# Patient Record
Sex: MALE | Race: WHITE
[De-identification: names, ages, dates, MRNs, and addresses within clinical notes are randomized per-mention and may not be internally consistent; named-entity substitution may affect disease eponyms.]

---

## 2017-06-22 ENCOUNTER — HOSPITAL ENCOUNTER (EMERGENCY)
Dept: HOSPITAL 10 - FTE | Age: 37
Discharge: HOME | End: 2017-06-22
Payer: MEDICAID

## 2017-06-22 VITALS
SYSTOLIC BLOOD PRESSURE: 153 MMHG | HEART RATE: 72 BPM | TEMPERATURE: 98.1 F | DIASTOLIC BLOOD PRESSURE: 81 MMHG | RESPIRATION RATE: 18 BRPM

## 2017-06-22 VITALS
WEIGHT: 177.47 LBS | HEIGHT: 65 IN | WEIGHT: 177.47 LBS | BODY MASS INDEX: 29.57 KG/M2 | HEIGHT: 65 IN | BODY MASS INDEX: 29.57 KG/M2

## 2017-06-22 DIAGNOSIS — R10.32: ICD-10-CM

## 2017-06-22 DIAGNOSIS — N20.0: Primary | ICD-10-CM

## 2017-06-22 LAB
ADD SCAN DIFF: NO
ALBUMIN SERPL-MCNC: 5.2 G/DL (ref 3.3–4.9)
ALBUMIN/GLOB SERPL: 1.52 {RATIO}
ALP SERPL-CCNC: 84 IU/L (ref 42–121)
ALT SERPL-CCNC: 100 IU/L (ref 13–69)
ANION GAP SERPL CALC-SCNC: 17 MMOL/L (ref 8–16)
AST SERPL-CCNC: 48 IU/L (ref 15–46)
BASOPHILS # BLD AUTO: 0 10^3/UL (ref 0–0.1)
BASOPHILS NFR BLD: 0.2 % (ref 0–2)
BILIRUB DIRECT SERPL-MCNC: 0 MG/DL (ref 0–0.2)
BILIRUB SERPL-MCNC: 0.1 MG/DL (ref 0.2–1.3)
BUN SERPL-MCNC: 10 MG/DL (ref 7–20)
CALCIUM SERPL-MCNC: 9.2 MG/DL (ref 8.4–10.2)
CHLORIDE SERPL-SCNC: 101 MMOL/L (ref 97–110)
CO2 SERPL-SCNC: 24 MMOL/L (ref 21–31)
CREAT SERPL-MCNC: 0.97 MG/DL (ref 0.61–1.24)
EOSINOPHIL # BLD: 0 10^3/UL (ref 0–0.5)
EOSINOPHIL NFR BLD: 0.1 % (ref 0–7)
ERYTHROCYTE [DISTWIDTH] IN BLOOD BY AUTOMATED COUNT: 12.1 % (ref 11.5–14.5)
GLOBULIN SER-MCNC: 3.4 G/DL (ref 1.3–3.2)
GLUCOSE SERPL-MCNC: 137 MG/DL (ref 70–220)
HCT VFR BLD CALC: 43.8 % (ref 42–52)
HGB BLD-MCNC: 14.8 G/DL (ref 14–18)
LYMPHOCYTES # BLD AUTO: 1.6 10^3/UL (ref 0.8–2.9)
LYMPHOCYTES NFR BLD AUTO: 13 % (ref 15–51)
MCH RBC QN AUTO: 29.6 PG (ref 29–33)
MCHC RBC AUTO-ENTMCNC: 33.8 G/DL (ref 32–37)
MCV RBC AUTO: 87.6 FL (ref 82–101)
MONOCYTES # BLD: 0.7 10^3/UL (ref 0.3–0.9)
MONOCYTES NFR BLD: 5.9 % (ref 0–11)
NEUTROPHILS # BLD: 9.9 10^3/UL (ref 1.6–7.5)
NEUTROPHILS NFR BLD AUTO: 79.9 % (ref 39–77)
NRBC # BLD MANUAL: 0 10^3/UL (ref 0–0)
NRBC BLD QL: 0 /100WBC (ref 0–0)
PLATELET # BLD: 296 10^3/UL (ref 140–415)
PMV BLD AUTO: 10.2 FL (ref 7.4–10.4)
POTASSIUM SERPL-SCNC: 3.2 MMOL/L (ref 3.5–5.1)
PROT SERPL-MCNC: 8.6 G/DL (ref 6.1–8.1)
RBC # BLD AUTO: 5 10^6/UL (ref 4.7–6.1)
SODIUM SERPL-SCNC: 139 MMOL/L (ref 135–144)
URINE BLOOD (DIP) POC: (no result)
WBC # BLD AUTO: 12.4 10^3/UL (ref 4.8–10.8)

## 2017-06-22 PROCEDURE — 81003 URINALYSIS AUTO W/O SCOPE: CPT

## 2017-06-22 PROCEDURE — 96375 TX/PRO/DX INJ NEW DRUG ADDON: CPT

## 2017-06-22 PROCEDURE — 96374 THER/PROPH/DIAG INJ IV PUSH: CPT

## 2017-06-22 PROCEDURE — 80053 COMPREHEN METABOLIC PANEL: CPT

## 2017-06-22 PROCEDURE — 36415 COLL VENOUS BLD VENIPUNCTURE: CPT

## 2017-06-22 PROCEDURE — 85025 COMPLETE CBC W/AUTO DIFF WBC: CPT

## 2017-06-22 PROCEDURE — 83690 ASSAY OF LIPASE: CPT

## 2017-06-22 PROCEDURE — 74176 CT ABD & PELVIS W/O CONTRAST: CPT

## 2017-06-22 NOTE — ERD
ER Documentation


Chief Complaint


Date/Time


DATE: 6/22/17 


TIME: 18:33


Chief Complaint


AP RAD BACK SINCE THIS AM





HPI


This is a 37-year-old male who presents to the emergency room for evaluation of 

left-sided flank pain.  The patient states that he has had flank pain for one 

days duration and he has mild radiation to the groin.  The patient denies any 

nausea, vomiting, diarrhea, or fevers associated with this.  He does sharp pain 

with no aggravating or relieving factors for his symptoms.  He came to the ER 

today for evaluation per





ROS


All systems reviewed and are negative except as per history of present illness.





Medications


Home Meds


Reported Medications


[Denies]   No Conflict Check


   5/11/10





Allergies


Allergies:  


Coded Allergies:  


     No Known Drug Allergies (Verified  Allergy, Mild, 5/14/10)





PMhx/Soc


History of Surgery:  No


Anesthesia Reaction:  No


Hx Neurological Disorder:  No


Hx Respiratory Disorders:  No


Hx Cardiac Disorders:  No


Hx Psychiatric Problems:  No


Hx Miscellaneous Medical Probl:  No


Hx Alcohol Use:  Yes


Hx Substance Use:  No


Hx Tobacco Use:  No





Physical Exam


Vitals





Vital Signs








  Date Time  Temp Pulse Resp B/P Pulse Ox O2 Delivery O2 Flow Rate FiO2


 


6/22/17 15:24 98.2 61 20 164/78 99   








Physical Exam


INITIAL VITAL SIGNS: Reviewed by me


GENERAL:  The patient is well developed and appropriate for usual state of 

health in no apparent distress


HEENT: Pupils equal, round, and reactive to light.  EOMI. There is no scleral 

icterus.


NECK:  C-spine is soft and supple, there is no meningismus.  There is no 

cervical lymphadenopathy.


LUNGS:  Clear to auscultation bilaterally. There are no rales, wheezes or 

rhonchi.


HEART:  Regular rate and rhythm, no murmurs, clicks, rubs or gallops.


ABDOMEN: Left lower quadrant tenderness to palpation, otherwise soft, non-tender

, non-distended.  There are bowel sounds in all four quadrants. No rebound or 

guarding.


EXTREMITIES:  There is no peripheral cyanosis or edema.  No focal swelling or 

erythema.


NEUROLOGICAL:  The patient moves all four extremities with 5/5 strength.  

Cranial nerves II - XII are intact. Normal gait. Alert and oriented


SKIN:  There is no apparent rash or petechiae.


HEME/LYMPHATIC:  There is no evidence of excessive bruising or lymphedema.


PSYCHIATRIC:  The patient does not appear anxious or depressed.


Result Diagram:  


6/22/17 1540                                                                   

             6/22/17 1540





Results 24 hrs





 Laboratory Tests








Test


  6/22/17


15:40 6/22/17


16:57


 


White Blood Count 12.410^3/ul  


 


Red Blood Count 5.0010^6/ul  


 


Hemoglobin 14.8g/dl  


 


Hematocrit 43.8%  


 


Mean Corpuscular Volume 87.6fl  


 


Mean Corpuscular Hemoglobin 29.6pg  


 


Mean Corpuscular Hemoglobin


Concent 33.8g/dl 


  


 


 


Red Cell Distribution Width 12.1%  


 


Platelet Count 32098^3/UL  


 


Mean Platelet Volume 10.2fl  


 


Neutrophils % 79.9%  


 


Lymphocytes % 13.0%  


 


Monocytes % 5.9%  


 


Eosinophils % 0.1%  


 


Basophils % 0.2%  


 


Nucleated Red Blood Cells % 0.0/100WBC  


 


Neutrophils # 9.910^3/ul  


 


Lymphocytes # 1.610^3/ul  


 


Monocytes # 0.710^3/ul  


 


Eosinophils # 0.010^3/ul  


 


Basophils # 0.010^3/ul  


 


Nucleated Red Blood Cells # 0.010^3/ul  


 


Sodium Level 139mmol/L  


 


Potassium Level 3.2mmol/L  


 


Chloride Level 101mmol/L  


 


Carbon Dioxide Level 24mmol/L  


 


Anion Gap 17  


 


Blood Urea Nitrogen 10mg/dl  


 


Creatinine 0.97mg/dl  


 


Glucose Level 137mg/dl  


 


Calcium Level 9.2mg/dl  


 


Total Bilirubin 0.1mg/dl  


 


Direct Bilirubin 0.00mg/dl  


 


Indirect Bilirubin 0.1mg/dl  


 


Aspartate Amino Transf


(AST/SGOT) 48IU/L 


  


 


 


Alanine Aminotransferase


(ALT/SGPT) 100IU/L 


  


 


 


Alkaline Phosphatase 84IU/L  


 


Total Protein 8.6g/dl  


 


Albumin 5.2g/dl  


 


Globulin 3.40g/dl  


 


Albumin/Globulin Ratio 1.52  


 


Lipase 46U/L  


 


Bedside Urine pH (LAB)  7.0 


 


Bedside Urine Protein (LAB)  Negative 


 


Bedside Urine Glucose (UA)  Negative 


 


Bedside Urine Ketones (LAB)  Negative 


 


Bedside Urine Blood  2+ 


 


Bedside Urine Nitrite (LAB)  Negative 


 


Bedside Urine Leukocyte


Esterase (L 


  Negative 


 








 Current Medications








 Medications


  (Trade)  Dose


 Ordered  Sig/Desiree


 Route


 PRN Reason  Start Time


 Stop Time Status Last Admin


Dose Admin


 


 Sodium Chloride


  (NS)  1,000 ml @ 


 1,000 mls/hr  Q1H STAT


 IV


   6/22/17 15:32


 6/22/17 16:31 DC 6/22/17 15:46


 


 


 Morphine Sulfate


  (morphine)  2 mg  ONCE  STAT


 IV


   6/22/17 15:32


 6/22/17 15:33 DC 6/22/17 15:46


 


 


 Ondansetron HCl


  (Zofran Inj)  4 mg  ONCE  STAT


 IV


   6/22/17 15:32


 6/22/17 15:33 DC 6/22/17 15:46


 


 


 Famotidine


  (Pepcid Iv)  20 mg  ONCE  STAT


 IV


   6/22/17 15:32


 6/22/17 15:33 DC 6/22/17 15:46


 


 


 Potassium Chloride


  (Klor-Con 20)  40 meq  ONCE  STAT


 PO


   6/22/17 16:21


 6/22/17 16:23 DC 6/22/17 16:45


 


 


 Acetaminophen/


 Hydrocodone Bitart


  (Norco (5/325))  1 tab  ONCE  ONCE


 PO


   6/22/17 17:00


 6/22/17 17:02 DC 6/22/17 17:05


 











Procedures/MDM


CT abdomen pelvis without: 1.  2.3 mm calculus at the left ureterovesicular 

junction which is either on the verge of passing into the bladder or has just 

passed into the bladder.


2.  Associated mild left hydroureteronephrosis and left perinephric stranding.


3.  Normal right kidney and collecting system


4.  Normal appendix\





This is a 37-year-old male presents to the ER for evaluation of abdominal pain.

  On my examination this patient was nontoxic-appearing however he did have 

tenderness to palpation in the left lower quadrant.  I did obtain a urinalysis 

was does show blood in the urine.  CT of the abdomen and pelvis was also 

obtained which does show a 2.3 mm calculus at the left UVJ which is on the 

verge of passing into the bladder.  This patient has no fever, no leukocytosis 

that is significant and the patient's pain is controlled with morphine.  He 

will be discharged home at this time with a prescription for Norco to help him 

passed stone





Departure


Diagnosis:  


 Primary Impression:  


 Kidney stone on left side


 Additional Impressions:  


 Abdominal pain


 Left flank pain


Condition:  YOUSIF Farmer DO Jun 22, 2017 18:35

## 2017-06-22 NOTE — RADRPT
PROCEDURE:   CT abdomen and pelvis without contrast. 

 

CLINICAL INDICATION:   Lower abdominal pain 

 

TECHNIQUE:   Continues 2.5 mm axial images were obtained from the domes of the diaphragms to the inf
erior pubic rami.  No oral or intravenous contrast was administered. The calculated dose length prod
uct (DLP) = 591.03  mGy-cm.  Exam CTDlvol = 9.27 mGy.  One or more of the following dose reduction t
echniques were used: Automated exposure control, adjustment of the mA and or KV according to patient
 size, or use of iterative reconstruction technique.  One or more of the following dose reduction te
chniques were used: Automated exposure control, adjustment of the mA and or KV according to patient 
size, or use of iterative reconstruction technique.  

 

 

 

COMPARISON:   None. 

 

FINDINGS:

Lung bases are clear.  No pleural pericardial fluid is seen.

 

Liver, gallbladder, pancreas, spleen, and adrenals are within normal limits on this noncontrast stud
y.  Evaluation of the genitourinary system demonstrates a 2.3 mm calculus at the left ureterovesicul
ar junction level which is on the verge of passing into the bladder or has just passed into the blad
kezia.  There is mild associated left hydro ureteral nephrosis left perinephric stranding.  The right 
kidney and collecting system are normal.  Aorta is normal in caliber.  No pathologically enlarged me
senteric lymph nodes are seen.  The stomach and small bowel loops are within normal limits.  No smal
l bowel dilatation or obstruction is seen.  No free fluid, free air, abscess is noted in the upper a
bdomen

 

CT pelvis:  Images through the pelvis demonstrate no free fluid, free air, abscess.  Bladder is norm
ally distended.  Prostate and seminal vesicles are unremarkable.  Evaluation of the colon demonstrat
es no diverticulosis, diverticulitis or acute colitis. Normal appendix is visualized. No pathologica
lly enlarged iliac chain lymph nodes are seen.

 

No destructive bony lesions are identified.

 

IMPRESSION:

 

 

1.  2.3 mm calculus at the left ureterovesicular junction which is either on the verge of passing in
to the bladder or has just passed into the bladder.

2.  Associated mild left hydroureteronephrosis and left perinephric stranding.

3.  Normal right kidney and collecting system

4.  Normal appendix

 

 

 

RPTAT: HH

_____________________________________________ 

.Jesus Johnson MD, MD           Date    Time 

Electronically viewed and signed by .Jesus Johnson MD, MD on 06/22/2017 18:31 

 

D:  06/22/2017 18:31  T:  06/22/2017 18:31

.W/

## 2017-11-07 ENCOUNTER — HOSPITAL ENCOUNTER (EMERGENCY)
Dept: HOSPITAL 10 - FTE | Age: 37
Discharge: HOME | End: 2017-11-07
Payer: MEDICAID

## 2017-11-07 VITALS
WEIGHT: 176.59 LBS | HEIGHT: 67 IN | WEIGHT: 176.59 LBS | BODY MASS INDEX: 27.72 KG/M2 | BODY MASS INDEX: 27.72 KG/M2 | HEIGHT: 67 IN

## 2017-11-07 DIAGNOSIS — N20.0: Primary | ICD-10-CM

## 2017-11-07 PROCEDURE — 96374 THER/PROPH/DIAG INJ IV PUSH: CPT

## 2017-11-07 PROCEDURE — 74176 CT ABD & PELVIS W/O CONTRAST: CPT

## 2017-11-07 PROCEDURE — 85025 COMPLETE CBC W/AUTO DIFF WBC: CPT

## 2017-11-07 PROCEDURE — 96376 TX/PRO/DX INJ SAME DRUG ADON: CPT

## 2017-11-07 PROCEDURE — 83690 ASSAY OF LIPASE: CPT

## 2017-11-07 PROCEDURE — 96375 TX/PRO/DX INJ NEW DRUG ADDON: CPT

## 2017-11-07 PROCEDURE — 36415 COLL VENOUS BLD VENIPUNCTURE: CPT

## 2017-11-07 PROCEDURE — 81001 URINALYSIS AUTO W/SCOPE: CPT

## 2017-11-07 PROCEDURE — 80053 COMPREHEN METABOLIC PANEL: CPT

## 2017-11-07 NOTE — ERD
ER Documentation


Chief Complaint


Chief Complaint


left flank pain x 2 days





HPI


This is a 37-year-old male presents to the ER with left-sided flank pain that 

started last night.  Patient has had an episode like this in the past and was 

told he had kidney stones.  Pain was intermittent however now is constant and 

severe.  He admits to nausea however denies vomiting or diarrhea.  Patient 

states that this morning he did have one episode of brown urine, he does admit 

to dysuria however he denies urinary frequency.  Patient denies any fevers or 

chills.





ROS


12 point review of systems was done, all negative except per HPI.





Medications


Home Meds


Active Scripts


Ondansetron Hcl* (Zofran*) 4 Mg Tab, 4 MG PO Q4H Y for NAUSEA AND OR VOMITING 

for 3 Days, TAB


   Prov:ELMERMATJACIEL C         17


Ibuprofen* (Motrin*) 600 Mg Tab, 600 MG PO Q6, #30 TAB


   Prov:ELMERJACIEL C         17


Hydrocodone/Acetaminophen (Norco 5-325 Tablet) 1 Each Tablet, 1 TAB PO Q6H Y 

for PAIN, #20 TAB


   Prov:ELMER,JACIEL C         17


Tamsulosin Hcl* (Flomax*) 0.4 Mg Cap.er.24h, 0.4 MG PO QHS for 5 Days, CAP


   Prov:MAT PAYNEGISEL COOPER         17


Ciprofloxacin Hcl* (Ciprofloxacin Hcl*) 500 Mg Tablet, 500 MG PO BID for 7 Days

, TAB


   Prov:JACIEL PAYNE ALLISON         17


Tamsulosin Hcl* (Flomax*) 0.4 Mg Cap.er.24h, 0.4 MG PO BID, #10 CAP


   Prov:YOUSIF BRADSHAW DO         17


Hydrocodone/Acetaminophen (Norco 5-325 Tablet) 1 Each Tablet, 1 TAB PO Q6H Y 

for PAIN, #7 TAB


   Prov:YOUSIF BRADSHAW DO         17


Reported Medications


[Denies]   No Conflict Check


   5/11/10





Allergies


Allergies:  


Coded Allergies:  


     No Known Drug Allergies (Verified  Allergy, Mild, 5/14/10)





PMhx/Soc


Medical and Surgical Hx:  pt denies Medical Hx, pt denies Surgical Hx


History of Surgery:  No


Anesthesia Reaction:  No


Hx Neurological Disorder:  No


Hx Respiratory Disorders:  No


Hx Cardiac Disorders:  No


Hx Psychiatric Problems:  No


Hx Miscellaneous Medical Probl:  No


Hx Alcohol Use:  Yes (OOC)


Hx Substance Use:  No


Hx Tobacco Use:  No





Physical Exam


Vitals





Vital Signs








  Date Time  Temp Pulse Resp B/P Pulse Ox O2 Delivery O2 Flow Rate FiO2


 


17 14:10 98.7 53 18 155/91 99   








Physical Exam


GENERAL: The patient is well developed and appropriate for usual state of health

, in no apparent distress.


HEENT: Atraumatic. 


CHEST: Clear to auscultation bilaterally. There are no rales, wheezes or 

rhonchi. 


HEART: Regular rate and rhythm. No murmurs, clicks, rubs or gallops.


ABDOMEN: Soft, nontender and nondistended. Good bowel sounds. No rebound or 

guarding. No gross peritonitis. No gross organomegaly or masses. No Brantley sign 

or McBurney point tenderness.


BACK: No midline or flank tenderness..


NEURO: Alert and oriented. 


SKIN:  The skin is warm and dry.


Result Diagram:  


17 1517                                                                   

             17 1517





Results 24 hrs





 Laboratory Tests








Test


  17


15:17


 


White Blood Count 12.310^3/ul 


 


Red Blood Count 5.0010^6/ul 


 


Hemoglobin 15.1g/dl 


 


Hematocrit 45.0% 


 


Mean Corpuscular Volume 90.0fl 


 


Mean Corpuscular Hemoglobin 30.2pg 


 


Mean Corpuscular Hemoglobin


Concent 33.6g/dl 


 


 


Red Cell Distribution Width 11.8% 


 


Platelet Count 87790^3/UL 


 


Mean Platelet Volume 10.6fl 


 


Neutrophils % 80.9% 


 


Lymphocytes % 9.6% 


 


Monocytes % 8.6% 


 


Eosinophils % 0.2% 


 


Basophils % 0.1% 


 


Nucleated Red Blood Cells % 0.0/100WBC 


 


Neutrophils # 10.010^3/ul 


 


Lymphocytes # 1.210^3/ul 


 


Monocytes # 1.110^3/ul 


 


Eosinophils # 0.010^3/ul 


 


Basophils # 0.010^3/ul 


 


Nucleated Red Blood Cells # 0.010^3/ul 


 


Urine Color YELLOW 


 


Urine Clarity CLEAR 


 


Urine pH 5.0 


 


Urine Specific Gravity 1.014 


 


Urine Ketones NEGATIVEmg/dL 


 


Urine Nitrite NEGATIVEmg/dL 


 


Urine Bilirubin NEGATIVEmg/dL 


 


Urine Urobilinogen NEGATIVEmg/dL 


 


Urine Leukocyte Esterase NEGATIVELeu/ul 


 


Urine Microscopic RBC 37/HPF 


 


Urine Microscopic WBC 0/HPF 


 


Urine Bacteria FEW/HPF 


 


Urine Hemoglobin 2+mg/dL 


 


Urine Glucose NEGATIVEmg/dL 


 


Urine Total Protein NEGATIVEmg/dl 


 


Sodium Level 143mmol/L 


 


Potassium Level 4.1mmol/L 


 


Chloride Level 103mmol/L 


 


Carbon Dioxide Level 30mmol/L 


 


Anion Gap 14 


 


Blood Urea Nitrogen 13mg/dl 


 


Creatinine 1.24mg/dl 


 


Glucose Level 111mg/dl 


 


Calcium Level 9.8mg/dl 


 


Total Bilirubin 0.2mg/dl 


 


Direct Bilirubin 0.00mg/dl 


 


Indirect Bilirubin 0.2mg/dl 


 


Aspartate Amino Transf


(AST/SGOT) 52IU/L 


 


 


Alanine Aminotransferase


(ALT/SGPT) 96IU/L 


 


 


Alkaline Phosphatase 83IU/L 


 


Total Protein 8.5g/dl 


 


Albumin 4.6g/dl 


 


Globulin 3.90g/dl 


 


Albumin/Globulin Ratio 1.17 


 


Lipase 75U/L 








 Current Medications








 Medications


  (Trade)  Dose


 Ordered  Sig/Desiree


 Route


 PRN Reason  Start Time


 Stop Time Status Last Admin


Dose Admin


 


 Sodium Chloride


  (NS)  1,000 ml @ 


 1,000 mls/hr  Q1H STAT


 IV


   17 14:51


 17 15:50 DC 17 15:17


 


 


 Morphine Sulfate


  (morphine)  4 mg  ONCE  STAT


 IV


   17 14:51


 17 14:53 DC 17 15:17


 


 


 Ondansetron HCl


  (Zofran Inj)  4 mg  ONCE  STAT


 IV


   17 14:51


 17 14:53 DC 17 15:17


 


 


 Morphine Sulfate


  (morphine)  4 mg  ONCE  STAT


 IV


   17 15:55


 17 15:56 DC 17 16:05


 


 


 Ketorolac


 Tromethamine


  (Toradol)  30 mg  ONCE  STAT


 IV


   17 15:55


 17 15:56 DC 17 16:05


 





[place] [id number]


 Radiology Main Line: [phone]





 DIAGNOSTIC IMAGING REPORT





 Patient: LAUREN HEALY   : 1980   Age: 37  Sex: M                   

     


 MR #:    [id number]   Acct #:   [id number]    DOS: 17 1450


 Ordering MD: JACIEL PAYNE PA-C   Location:  [facility]   Room/Bed:              

                              


 








PROCEDURE:   CT abdomen and pelvis without contrast. 


 


CLINICAL INDICATION:   Abdominal pain. 


 


TECHNIQUE:   CT scan of the abdomen and pelvis without contrast was performed 

on a multi-slice CT scanner .     Sagittal and coronal reformatted images were 

obtained from the axial source images. 


 


One or more of the following dose reduction techniques were used:


- Automated exposure control.


- Adjustment of the mA and/or kV according to patient size.


- Use of iterative reconstruction technique.


 


 


.7 mGycm. 


CTDIvol 9.9 mGy


 


COMPARISON:   2017


 


FINDINGS:


 


The lung bases are clear.   There is limited evaluation of the solid viscera 

from the lack of IV contrast.


 


There is a left-sided distal ureteral stone that measures 4 mm at the UVJ and 

this results and left-sided mild to moderate hydroureter and hydronephrosis 

with perinephric fat stranding. Other small nonobstructing left lower pole 

renal calculi are present measuring less than 4 mm. No right-sided renal or 

ureteral calculi present with no right-sided hydronephrosis.


 


There is uniform density of the liver with no gross focal lesion or biliary 

ductal dilatation.  The gallbladder is unremarkable without inflammation.


 


The spleen is unremarkable without mass.  The adrenal glands are within normal 

limits without mass.  


 


The pancreas is unremarkable without focal lesion or surrounding inflammatory 

changes.


There is no bowel obstruction or focal bowel inflammation.  The appendix is 

unremarkable.  There is no free air or free fluid.  There are no enlarged lymph 

nodes.


 


The aorta is unremarkable and there is no acute osseous abnormality. Mild 

degenerative changes are visible in the lower lumbar spine.


 


The prostate is at the upper limits of normal in size.


 


 


IMPRESSION:


 


Distal left ureteral stone at the UVJ measures 4 mm causing left-sided mild to 

moderate hydronephrosis and hydroureter with perinephric fat stranding. Other 

small left-sided lower pole nonobstructing renal calculi are present.


 


No evidence of bowel obstruction or inflammation.  There is no appendicitis.


 


 


 


RPTAT: AA


_____________________________________________ 


.Osiel Rod MD, MD           Date    Time 


Electronically viewed and signed by .Osiel Rod MD, MD on 2017 16:25 


 


D:  2017 16:25  T:  2017 16:25


.J/





CC: JACIEL PAYNE





Procedures/MDM


This is a 37-year-old male presents to the ER with left flank pain for the last 

2 days.  Patient has a known history of kidney stones.  He does have a kidney 

stone with mild to moderate hydronephrosis and perinephric stranding.  This is 

case with my supervising physician .  Stone is capable of passing, 

patient is afebrile he does not have any leukocytosis and his pain was 

controlled in the ER.  He is stable for outpatient follow-up will be sent home 

with Cipro, Flomax, Norco, ibuprofen, Zofran.  Gave patient information for 

urologist and told him to follow-up with his primary care doctor within 1-2 

days for referral or to go directly to urologist for follow-up.  He should 

return to ER sooner if symptoms worsen.  Medical decision making sure with the 

patient understands and agrees with plan.





Departure


Diagnosis:  


 Primary Impression:  


 Kidney stones


Condition:  Stable











JACIEL PAYNE 2017 15:15

## 2017-11-07 NOTE — RADRPT
PROCEDURE:   CT abdomen and pelvis without contrast. 

 

CLINICAL INDICATION:   Abdominal pain. 

 

TECHNIQUE:   CT scan of the abdomen and pelvis without contrast was performed on a multi-slice CT [facility] .     Sagittal and coronal reformatted images were obtained from the axial source images. 

 

One or more of the following dose reduction techniques were used:

- Automated exposure control.

- Adjustment of the mA and/or kV according to patient size.

- Use of iterative reconstruction technique.

 

 

.7 mGycm. 

CTDIvol 9.9 mGy

 

COMPARISON:   06/22/2017

 

FINDINGS:

 

The lung bases are clear.   There is limited evaluation of the solid viscera from the lack of IV con
trast.

 

There is a left-sided distal ureteral stone that measures 4 mm at the UVJ and this results and left-
sided mild to moderate hydroureter and hydronephrosis with perinephric fat stranding. Other small no
nobstructing left lower pole renal calculi are present measuring less than 4 mm. No right-sided shiv
l or ureteral calculi present with no right-sided hydronephrosis.

 

There is uniform density of the liver with no gross focal lesion or biliary ductal dilatation.  The 
gallbladder is unremarkable without inflammation.

 

The spleen is unremarkable without mass.  The adrenal glands are within normal limits without mass. 
 

 

The pancreas is unremarkable without focal lesion or surrounding inflammatory changes.

There is no bowel obstruction or focal bowel inflammation.  The appendix is unremarkable.  There is 
no free air or free fluid.  There are no enlarged lymph nodes.

 

The aorta is unremarkable and there is no acute osseous abnormality. Mild degenerative changes are v
isible in the lower lumbar spine.

 

The prostate is at the upper limits of normal in size.

 

 

IMPRESSION:

 

Distal left ureteral stone at the UVJ measures 4 mm causing left-sided mild to moderate hydronephros
is and hydroureter with perinephric fat stranding. Other small left-sided lower pole nonobstructing 
renal calculi are present.

 

No evidence of bowel obstruction or inflammation.  There is no appendicitis.

 

 

 

RPTAT: AA

_____________________________________________ 

.Osiel Rod MD, MD           Date    Time 

Electronically viewed and signed by .Osiel Rod MD, MD on 11/07/2017 16:25 

 

D:  11/07/2017 16:25  T:  11/07/2017 16:25

.J/

## 2017-11-07 NOTE — ERD
ER Documentation


Chief Complaint


Chief Complaint


left flank pain x 2 days





HPI


This is a 37-year-old male presents to the ER with left-sided flank pain that 

started last night.  Patient has had an episode like this in the past and was 

told he had kidney stones.  Pain was intermittent however now is constant and 

severe.  He admits to nausea however denies vomiting or diarrhea.  Patient 

states that this morning he did have one episode of brown urine, he does admit 

to dysuria however he denies urinary frequency.  Patient denies any fevers or 

chills.





ROS


12 point review of systems was done, all negative except per HPI.





Medications


Home Meds


Active Scripts


Ondansetron Hcl* (Zofran*) 4 Mg Tab, 4 MG PO Q4H Y for NAUSEA AND OR VOMITING 

for 3 Days, TAB


   Prov:ELMERMATJACIEL C         17


Ibuprofen* (Motrin*) 600 Mg Tab, 600 MG PO Q6, #30 TAB


   Prov:ELMERJACIEL C         17


Hydrocodone/Acetaminophen (Norco 5-325 Tablet) 1 Each Tablet, 1 TAB PO Q6H Y 

for PAIN, #20 TAB


   Prov:ELMER,JACIEL C         17


Tamsulosin Hcl* (Flomax*) 0.4 Mg Cap.er.24h, 0.4 MG PO QHS for 5 Days, CAP


   Prov:MAT PAYNEGISEL COOPER         17


Ciprofloxacin Hcl* (Ciprofloxacin Hcl*) 500 Mg Tablet, 500 MG PO BID for 7 Days

, TAB


   Prov:JACIEL PAYNE ALLISON         17


Tamsulosin Hcl* (Flomax*) 0.4 Mg Cap.er.24h, 0.4 MG PO BID, #10 CAP


   Prov:YOUSIF BRADSHAW DO         17


Hydrocodone/Acetaminophen (Norco 5-325 Tablet) 1 Each Tablet, 1 TAB PO Q6H Y 

for PAIN, #7 TAB


   Prov:YOUSIF BRADSHAW DO         17


Reported Medications


[Denies]   No Conflict Check


   5/11/10





Allergies


Allergies:  


Coded Allergies:  


     No Known Drug Allergies (Verified  Allergy, Mild, 5/14/10)





PMhx/Soc


Medical and Surgical Hx:  pt denies Medical Hx, pt denies Surgical Hx


History of Surgery:  No


Anesthesia Reaction:  No


Hx Neurological Disorder:  No


Hx Respiratory Disorders:  No


Hx Cardiac Disorders:  No


Hx Psychiatric Problems:  No


Hx Miscellaneous Medical Probl:  No


Hx Alcohol Use:  Yes (OOC)


Hx Substance Use:  No


Hx Tobacco Use:  No





Physical Exam


Vitals





Vital Signs








  Date Time  Temp Pulse Resp B/P Pulse Ox O2 Delivery O2 Flow Rate FiO2


 


17 14:10 98.7 53 18 155/91 99   








Physical Exam


GENERAL: The patient is well developed and appropriate for usual state of health

, in no apparent distress.


HEENT: Atraumatic. 


CHEST: Clear to auscultation bilaterally. There are no rales, wheezes or 

rhonchi. 


HEART: Regular rate and rhythm. No murmurs, clicks, rubs or gallops.


ABDOMEN: Soft, nontender and nondistended. Good bowel sounds. No rebound or 

guarding. No gross peritonitis. No gross organomegaly or masses. No Brantley sign 

or McBurney point tenderness.


BACK: No midline or flank tenderness..


NEURO: Alert and oriented. 


SKIN:  The skin is warm and dry.


Result Diagram:  


17 1517                                                                   

             17 1517





Results 24 hrs





 Laboratory Tests








Test


  17


15:17


 


White Blood Count 12.310^3/ul 


 


Red Blood Count 5.0010^6/ul 


 


Hemoglobin 15.1g/dl 


 


Hematocrit 45.0% 


 


Mean Corpuscular Volume 90.0fl 


 


Mean Corpuscular Hemoglobin 30.2pg 


 


Mean Corpuscular Hemoglobin


Concent 33.6g/dl 


 


 


Red Cell Distribution Width 11.8% 


 


Platelet Count 30542^3/UL 


 


Mean Platelet Volume 10.6fl 


 


Neutrophils % 80.9% 


 


Lymphocytes % 9.6% 


 


Monocytes % 8.6% 


 


Eosinophils % 0.2% 


 


Basophils % 0.1% 


 


Nucleated Red Blood Cells % 0.0/100WBC 


 


Neutrophils # 10.010^3/ul 


 


Lymphocytes # 1.210^3/ul 


 


Monocytes # 1.110^3/ul 


 


Eosinophils # 0.010^3/ul 


 


Basophils # 0.010^3/ul 


 


Nucleated Red Blood Cells # 0.010^3/ul 


 


Urine Color YELLOW 


 


Urine Clarity CLEAR 


 


Urine pH 5.0 


 


Urine Specific Gravity 1.014 


 


Urine Ketones NEGATIVEmg/dL 


 


Urine Nitrite NEGATIVEmg/dL 


 


Urine Bilirubin NEGATIVEmg/dL 


 


Urine Urobilinogen NEGATIVEmg/dL 


 


Urine Leukocyte Esterase NEGATIVELeu/ul 


 


Urine Microscopic RBC 37/HPF 


 


Urine Microscopic WBC 0/HPF 


 


Urine Bacteria FEW/HPF 


 


Urine Hemoglobin 2+mg/dL 


 


Urine Glucose NEGATIVEmg/dL 


 


Urine Total Protein NEGATIVEmg/dl 


 


Sodium Level 143mmol/L 


 


Potassium Level 4.1mmol/L 


 


Chloride Level 103mmol/L 


 


Carbon Dioxide Level 30mmol/L 


 


Anion Gap 14 


 


Blood Urea Nitrogen 13mg/dl 


 


Creatinine 1.24mg/dl 


 


Glucose Level 111mg/dl 


 


Calcium Level 9.8mg/dl 


 


Total Bilirubin 0.2mg/dl 


 


Direct Bilirubin 0.00mg/dl 


 


Indirect Bilirubin 0.2mg/dl 


 


Aspartate Amino Transf


(AST/SGOT) 52IU/L 


 


 


Alanine Aminotransferase


(ALT/SGPT) 96IU/L 


 


 


Alkaline Phosphatase 83IU/L 


 


Total Protein 8.5g/dl 


 


Albumin 4.6g/dl 


 


Globulin 3.90g/dl 


 


Albumin/Globulin Ratio 1.17 


 


Lipase 75U/L 








 Current Medications








 Medications


  (Trade)  Dose


 Ordered  Sig/Desiree


 Route


 PRN Reason  Start Time


 Stop Time Status Last Admin


Dose Admin


 


 Sodium Chloride


  (NS)  1,000 ml @ 


 1,000 mls/hr  Q1H STAT


 IV


   17 14:51


 17 15:50 DC 17 15:17


 


 


 Morphine Sulfate


  (morphine)  4 mg  ONCE  STAT


 IV


   17 14:51


 17 14:53 DC 17 15:17


 


 


 Ondansetron HCl


  (Zofran Inj)  4 mg  ONCE  STAT


 IV


   17 14:51


 17 14:53 DC 17 15:17


 


 


 Morphine Sulfate


  (morphine)  4 mg  ONCE  STAT


 IV


   17 15:55


 17 15:56 DC 17 16:05


 


 


 Ketorolac


 Tromethamine


  (Toradol)  30 mg  ONCE  STAT


 IV


   17 15:55


 17 15:56 DC 17 16:05


 





[place] [id number]


 Radiology Main Line: [phone]





 DIAGNOSTIC IMAGING REPORT





 Patient: LAUREN HEALY   : 1980   Age: 37  Sex: M                   

     


 MR #:    [id number]   Acct #:   [id number]    DOS: 17 145


 Ordering MD: JACIEL PAYNE PA-C   Location:  [facility]   Room/Bed:              

                              


 








PROCEDURE:   CT abdomen and pelvis without contrast. 


 


CLINICAL INDICATION:   Abdominal pain. 


 


TECHNIQUE:   CT scan of the abdomen and pelvis without contrast was performed 

on a multi-slice CT scanner .     Sagittal and coronal reformatted images were 

obtained from the axial source images. 


 


One or more of the following dose reduction techniques were used:


- Automated exposure control.


- Adjustment of the mA and/or kV according to patient size.


- Use of iterative reconstruction technique.


 


 


.7 mGycm. 


CTDIvol 9.9 mGy


 


COMPARISON:   2017


 


FINDINGS:


 


The lung bases are clear.   There is limited evaluation of the solid viscera 

from the lack of IV contrast.


 


There is a left-sided distal ureteral stone that measures 4 mm at the UVJ and 

this results and left-sided mild to moderate hydroureter and hydronephrosis 

with perinephric fat stranding. Other small nonobstructing left lower pole 

renal calculi are present measuring less than 4 mm. No right-sided renal or 

ureteral calculi present with no right-sided hydronephrosis.


 


There is uniform density of the liver with no gross focal lesion or biliary 

ductal dilatation.  The gallbladder is unremarkable without inflammation.


 


The spleen is unremarkable without mass.  The adrenal glands are within normal 

limits without mass.  


 


The pancreas is unremarkable without focal lesion or surrounding inflammatory 

changes.


There is no bowel obstruction or focal bowel inflammation.  The appendix is 

unremarkable.  There is no free air or free fluid.  There are no enlarged lymph 

nodes.


 


The aorta is unremarkable and there is no acute osseous abnormality. Mild 

degenerative changes are visible in the lower lumbar spine.


 


The prostate is at the upper limits of normal in size.


 


 


IMPRESSION:


 


Distal left ureteral stone at the UVJ measures 4 mm causing left-sided mild to 

moderate hydronephrosis and hydroureter with perinephric fat stranding. Other 

small left-sided lower pole nonobstructing renal calculi are present.


 


No evidence of bowel obstruction or inflammation.  There is no appendicitis.


 


 


 


RPTAT: AA


_____________________________________________ 


.Osiel Rod MD, MD           Date    Time 


Electronically viewed and signed by .Osiel Rod MD, MD on 2017 16:25 


 


D:  2017 16:25  T:  2017 16:25


.J/





CC: JACIEL PAYNE





Procedures/MDM


This is a 37-year-old male presents to the ER with left flank pain for the last 

2 days.  Patient has a known history of kidney stones.  He does have a kidney 

stone with mild to moderate hydronephrosis and perinephric stranding.  This is 

case with my supervising physician .  Stone is capable of passing, 

patient is afebrile he does not have any leukocytosis and his pain was 

controlled in the ER.  He is stable for outpatient follow-up will be sent home 

with Cipro, Flomax, Norco, ibuprofen, Zofran.  Gave patient information for 

urologist and told him to follow-up with his primary care doctor within 1-2 

days for referral or to go directly to urologist for follow-up.  He should 

return to ER sooner if symptoms worsen.  Medical decision making sure with the 

patient understands and agrees with plan.





Departure


Diagnosis:  


 Primary Impression:  


 Kidney stones


Condition:  Stable











JACIEL PAYNE 2017 15:15

## 2017-11-07 NOTE — RADRPT
PROCEDURE:   CT abdomen and pelvis without contrast. 

 

CLINICAL INDICATION:   Abdominal pain. 

 

TECHNIQUE:   CT scan of the abdomen and pelvis without contrast was performed on a multi-slice CT [facility] .     Sagittal and coronal reformatted images were obtained from the axial source images. 

 

One or more of the following dose reduction techniques were used:

- Automated exposure control.

- Adjustment of the mA and/or kV according to patient size.

- Use of iterative reconstruction technique.

 

 

.7 mGycm. 

CTDIvol 9.9 mGy

 

COMPARISON:   06/22/2017

 

FINDINGS:

 

The lung bases are clear.   There is limited evaluation of the solid viscera from the lack of IV con
trast.

 

There is a left-sided distal ureteral stone that measures 4 mm at the UVJ and this results and left-
sided mild to moderate hydroureter and hydronephrosis with perinephric fat stranding. Other small no
nobstructing left lower pole renal calculi are present measuring less than 4 mm. No right-sided shiv
l or ureteral calculi present with no right-sided hydronephrosis.

 

There is uniform density of the liver with no gross focal lesion or biliary ductal dilatation.  The 
gallbladder is unremarkable without inflammation.

 

The spleen is unremarkable without mass.  The adrenal glands are within normal limits without mass. 
 

 

The pancreas is unremarkable without focal lesion or surrounding inflammatory changes.

There is no bowel obstruction or focal bowel inflammation.  The appendix is unremarkable.  There is 
no free air or free fluid.  There are no enlarged lymph nodes.

 

The aorta is unremarkable and there is no acute osseous abnormality. Mild degenerative changes are v
isible in the lower lumbar spine.

 

The prostate is at the upper limits of normal in size.

 

 

IMPRESSION:

 

Distal left ureteral stone at the UVJ measures 4 mm causing left-sided mild to moderate hydronephros
is and hydroureter with perinephric fat stranding. Other small left-sided lower pole nonobstructing 
renal calculi are present.

 

No evidence of bowel obstruction or inflammation.  There is no appendicitis.

 

 

 

RPTAT: AA

_____________________________________________ 

.Osiel Rod MD, MD           Date    Time 

Electronically viewed and signed by .Osiel Rod MD, MD on 11/07/2017 16:25 

 

D:  11/07/2017 16:25  T:  11/07/2017 16:25

.J/

## 2017-11-07 NOTE — RADRPT
PROCEDURE:   CT abdomen and pelvis without contrast. 

 

CLINICAL INDICATION:   Abdominal pain. 

 

TECHNIQUE:   CT scan of the abdomen and pelvis without contrast was performed on a multi-slice CT [facility] .     Sagittal and coronal reformatted images were obtained from the axial source images. 

 

One or more of the following dose reduction techniques were used:

- Automated exposure control.

- Adjustment of the mA and/or kV according to patient size.

- Use of iterative reconstruction technique.

 

 

.7 mGycm. 

CTDIvol 9.9 mGy

 

COMPARISON:   06/22/2017

 

FINDINGS:

 

The lung bases are clear.   There is limited evaluation of the solid viscera from the lack of IV con
trast.

 

There is a left-sided distal ureteral stone that measures 4 mm at the UVJ and this results and left-
sided mild to moderate hydroureter and hydronephrosis with perinephric fat stranding. Other small no
nobstructing left lower pole renal calculi are present measuring less than 4 mm. No right-sided shiv
l or ureteral calculi present with no right-sided hydronephrosis.

 

There is uniform density of the liver with no gross focal lesion or biliary ductal dilatation.  The 
gallbladder is unremarkable without inflammation.

 

The spleen is unremarkable without mass.  The adrenal glands are within normal limits without mass. 
 

 

The pancreas is unremarkable without focal lesion or surrounding inflammatory changes.

There is no bowel obstruction or focal bowel inflammation.  The appendix is unremarkable.  There is 
no free air or free fluid.  There are no enlarged lymph nodes.

 

The aorta is unremarkable and there is no acute osseous abnormality. Mild degenerative changes are v
isible in the lower lumbar spine.

 

The prostate is at the upper limits of normal in size.

 

 

IMPRESSION:

 

Distal left ureteral stone at the UVJ measures 4 mm causing left-sided mild to moderate hydronephros
is and hydroureter with perinephric fat stranding. Other small left-sided lower pole nonobstructing 
renal calculi are present.

 

No evidence of bowel obstruction or inflammation.  There is no appendicitis.

 

 

 

RPTAT: AA

_____________________________________________ 

.Osiel Rod MD, MD           Date    Time 

Electronically viewed and signed by .Osiel Rod MD, MD on 11/07/2017 16:25 

 

D:  11/07/2017 16:25  T:  11/07/2017 16:25

.J/

## 2018-06-23 ENCOUNTER — HOSPITAL ENCOUNTER (EMERGENCY)
Dept: HOSPITAL 91 - FTE | Age: 38
Discharge: HOME | End: 2018-06-23
Payer: MEDICAID

## 2018-06-23 ENCOUNTER — HOSPITAL ENCOUNTER (EMERGENCY)
Age: 38
Discharge: HOME | End: 2018-06-23

## 2018-06-23 DIAGNOSIS — K04.7: Primary | ICD-10-CM

## 2018-06-23 PROCEDURE — 96372 THER/PROPH/DIAG INJ SC/IM: CPT

## 2018-06-23 PROCEDURE — 99285 EMERGENCY DEPT VISIT HI MDM: CPT

## 2018-06-23 PROCEDURE — 70486 CT MAXILLOFACIAL W/O DYE: CPT

## 2018-06-23 RX ADMIN — CEFTRIAXONE SODIUM 1 GM: 1 INJECTION, POWDER, FOR SOLUTION INTRAMUSCULAR; INTRAVENOUS at 12:07

## 2018-06-23 RX ADMIN — LIDOCAINE HYDROCHLORIDE 1 ML: 10 INJECTION, SOLUTION INFILTRATION; PERINEURAL at 12:07

## 2023-05-09 NOTE — ERD
ER Documentation


Chief Complaint


Chief Complaint


left flank pain x 2 days





HPI


This is a 37-year-old male presents to the ER with left-sided flank pain that 

started last night.  Patient has had an episode like this in the past and was 

told he had kidney stones.  Pain was intermittent however now is constant and 

severe.  He admits to nausea however denies vomiting or diarrhea.  Patient 

states that this morning he did have one episode of brown urine, he does admit 

to dysuria however he denies urinary frequency.  Patient denies any fevers or 

chills.





ROS


12 point review of systems was done, all negative except per HPI.





Medications


Home Meds


Active Scripts


Ondansetron Hcl* (Zofran*) 4 Mg Tab, 4 MG PO Q4H Y for NAUSEA AND OR VOMITING 

for 3 Days, TAB


   Prov:ELMERMATJACIEL C         17


Ibuprofen* (Motrin*) 600 Mg Tab, 600 MG PO Q6, #30 TAB


   Prov:ELMERJACIEL C         17


Hydrocodone/Acetaminophen (Norco 5-325 Tablet) 1 Each Tablet, 1 TAB PO Q6H Y 

for PAIN, #20 TAB


   Prov:ELMER,JACIEL C         17


Tamsulosin Hcl* (Flomax*) 0.4 Mg Cap.er.24h, 0.4 MG PO QHS for 5 Days, CAP


   Prov:MAT PAYNEGISEL COOPER         17


Ciprofloxacin Hcl* (Ciprofloxacin Hcl*) 500 Mg Tablet, 500 MG PO BID for 7 Days

, TAB


   Prov:JACIEL PAYNE ALLISON         17


Tamsulosin Hcl* (Flomax*) 0.4 Mg Cap.er.24h, 0.4 MG PO BID, #10 CAP


   Prov:YOUSIF BRADSHAW DO         17


Hydrocodone/Acetaminophen (Norco 5-325 Tablet) 1 Each Tablet, 1 TAB PO Q6H Y 

for PAIN, #7 TAB


   Prov:YOUSIF BRADSHAW DO         17


Reported Medications


[Denies]   No Conflict Check


   5/11/10





Allergies


Allergies:  


Coded Allergies:  


     No Known Drug Allergies (Verified  Allergy, Mild, 5/14/10)





PMhx/Soc


Medical and Surgical Hx:  pt denies Medical Hx, pt denies Surgical Hx


History of Surgery:  No


Anesthesia Reaction:  No


Hx Neurological Disorder:  No


Hx Respiratory Disorders:  No


Hx Cardiac Disorders:  No


Hx Psychiatric Problems:  No


Hx Miscellaneous Medical Probl:  No


Hx Alcohol Use:  Yes (OOC)


Hx Substance Use:  No


Hx Tobacco Use:  No





Physical Exam


Vitals





Vital Signs








  Date Time  Temp Pulse Resp B/P Pulse Ox O2 Delivery O2 Flow Rate FiO2


 


17 14:10 98.7 53 18 155/91 99   








Physical Exam


GENERAL: The patient is well developed and appropriate for usual state of health

, in no apparent distress.


HEENT: Atraumatic. 


CHEST: Clear to auscultation bilaterally. There are no rales, wheezes or 

rhonchi. 


HEART: Regular rate and rhythm. No murmurs, clicks, rubs or gallops.


ABDOMEN: Soft, nontender and nondistended. Good bowel sounds. No rebound or 

guarding. No gross peritonitis. No gross organomegaly or masses. No Brantley sign 

or McBurney point tenderness.


BACK: No midline or flank tenderness..


NEURO: Alert and oriented. 


SKIN:  The skin is warm and dry.


Result Diagram:  


17 1517                                                                   

             17 1517





Results 24 hrs





 Laboratory Tests








Test


  17


15:17


 


White Blood Count 12.310^3/ul 


 


Red Blood Count 5.0010^6/ul 


 


Hemoglobin 15.1g/dl 


 


Hematocrit 45.0% 


 


Mean Corpuscular Volume 90.0fl 


 


Mean Corpuscular Hemoglobin 30.2pg 


 


Mean Corpuscular Hemoglobin


Concent 33.6g/dl 


 


 


Red Cell Distribution Width 11.8% 


 


Platelet Count 89287^3/UL 


 


Mean Platelet Volume 10.6fl 


 


Neutrophils % 80.9% 


 


Lymphocytes % 9.6% 


 


Monocytes % 8.6% 


 


Eosinophils % 0.2% 


 


Basophils % 0.1% 


 


Nucleated Red Blood Cells % 0.0/100WBC 


 


Neutrophils # 10.010^3/ul 


 


Lymphocytes # 1.210^3/ul 


 


Monocytes # 1.110^3/ul 


 


Eosinophils # 0.010^3/ul 


 


Basophils # 0.010^3/ul 


 


Nucleated Red Blood Cells # 0.010^3/ul 


 


Urine Color YELLOW 


 


Urine Clarity CLEAR 


 


Urine pH 5.0 


 


Urine Specific Gravity 1.014 


 


Urine Ketones NEGATIVEmg/dL 


 


Urine Nitrite NEGATIVEmg/dL 


 


Urine Bilirubin NEGATIVEmg/dL 


 


Urine Urobilinogen NEGATIVEmg/dL 


 


Urine Leukocyte Esterase NEGATIVELeu/ul 


 


Urine Microscopic RBC 37/HPF 


 


Urine Microscopic WBC 0/HPF 


 


Urine Bacteria FEW/HPF 


 


Urine Hemoglobin 2+mg/dL 


 


Urine Glucose NEGATIVEmg/dL 


 


Urine Total Protein NEGATIVEmg/dl 


 


Sodium Level 143mmol/L 


 


Potassium Level 4.1mmol/L 


 


Chloride Level 103mmol/L 


 


Carbon Dioxide Level 30mmol/L 


 


Anion Gap 14 


 


Blood Urea Nitrogen 13mg/dl 


 


Creatinine 1.24mg/dl 


 


Glucose Level 111mg/dl 


 


Calcium Level 9.8mg/dl 


 


Total Bilirubin 0.2mg/dl 


 


Direct Bilirubin 0.00mg/dl 


 


Indirect Bilirubin 0.2mg/dl 


 


Aspartate Amino Transf


(AST/SGOT) 52IU/L 


 


 


Alanine Aminotransferase


(ALT/SGPT) 96IU/L 


 


 


Alkaline Phosphatase 83IU/L 


 


Total Protein 8.5g/dl 


 


Albumin 4.6g/dl 


 


Globulin 3.90g/dl 


 


Albumin/Globulin Ratio 1.17 


 


Lipase 75U/L 








 Current Medications








 Medications


  (Trade)  Dose


 Ordered  Sig/Desiree


 Route


 PRN Reason  Start Time


 Stop Time Status Last Admin


Dose Admin


 


 Sodium Chloride


  (NS)  1,000 ml @ 


 1,000 mls/hr  Q1H STAT


 IV


   17 14:51


 17 15:50 DC 17 15:17


 


 


 Morphine Sulfate


  (morphine)  4 mg  ONCE  STAT


 IV


   17 14:51


 17 14:53 DC 17 15:17


 


 


 Ondansetron HCl


  (Zofran Inj)  4 mg  ONCE  STAT


 IV


   17 14:51


 17 14:53 DC 17 15:17


 


 


 Morphine Sulfate


  (morphine)  4 mg  ONCE  STAT


 IV


   17 15:55


 17 15:56 DC 17 16:05


 


 


 Ketorolac


 Tromethamine


  (Toradol)  30 mg  ONCE  STAT


 IV


   17 15:55


 17 15:56 DC 17 16:05


 





[place] [address]


 Radiology Main Line: [phone]





 DIAGNOSTIC IMAGING REPORT





 Patient: LAUREN HEALY   : 1980   Age: 37  Sex: M                   

     


 MR #:    [id number]   Acct #:   [id number]    DOS: 17 1450


 Ordering MD: JACIEL PAYNE PA-C   Location:  FTE   Room/Bed:              

                              


 








PROCEDURE:   CT abdomen and pelvis without contrast. 


 


CLINICAL INDICATION:   Abdominal pain. 


 


TECHNIQUE:   CT scan of the abdomen and pelvis without contrast was performed 

on a multi-slice CT scanner .     Sagittal and coronal reformatted images were 

obtained from the axial source images. 


 


One or more of the following dose reduction techniques were used:


- Automated exposure control.


- Adjustment of the mA and/or kV according to patient size.


- Use of iterative reconstruction technique.


 


 


.7 mGycm. 


CTDIvol 9.9 mGy


 


COMPARISON:   2017


 


FINDINGS:


 


The lung bases are clear.   There is limited evaluation of the solid viscera 

from the lack of IV contrast.


 


There is a left-sided distal ureteral stone that measures 4 mm at the UVJ and 

this results and left-sided mild to moderate hydroureter and hydronephrosis 

with perinephric fat stranding. Other small nonobstructing left lower pole 

renal calculi are present measuring less than 4 mm. No right-sided renal or 

ureteral calculi present with no right-sided hydronephrosis.


 


There is uniform density of the liver with no gross focal lesion or biliary 

ductal dilatation.  The gallbladder is unremarkable without inflammation.


 


The spleen is unremarkable without mass.  The adrenal glands are within normal 

limits without mass.  


 


The pancreas is unremarkable without focal lesion or surrounding inflammatory 

changes.


There is no bowel obstruction or focal bowel inflammation.  The appendix is 

unremarkable.  There is no free air or free fluid.  There are no enlarged lymph 

nodes.


 


The aorta is unremarkable and there is no acute osseous abnormality. Mild 

degenerative changes are visible in the lower lumbar spine.


 


The prostate is at the upper limits of normal in size.


 


 


IMPRESSION:


 


Distal left ureteral stone at the UVJ measures 4 mm causing left-sided mild to 

moderate hydronephrosis and hydroureter with perinephric fat stranding. Other 

small left-sided lower pole nonobstructing renal calculi are present.


 


No evidence of bowel obstruction or inflammation.  There is no appendicitis.


 


 


 


RPTAT: AA


_____________________________________________ 


.Osiel Rod MD, MD           Date    Time 


Electronically viewed and signed by .Osiel Rod MD, MD on 2017 16:25 


 


D:  2017 16:25  T:  2017 16:25


.J/





CC: JACIEL PAYNE





Procedures/MDM


This is a 37-year-old male presents to the ER with left flank pain for the last 

2 days.  Patient has a known history of kidney stones.  He does have a kidney 

stone with mild to moderate hydronephrosis and perinephric stranding.  This is 

case with my supervising physician .  Stone is capable of passing, 

patient is afebrile he does not have any leukocytosis and his pain was 

controlled in the ER.  He is stable for outpatient follow-up will be sent home 

with Cipro, Flomax, Norco, ibuprofen, Zofran.  Gave patient information for 

urologist and told him to follow-up with his primary care doctor within 1-2 

days for referral or to go directly to urologist for follow-up.  He should 

return to ER sooner if symptoms worsen.  Medical decision making sure with the 

patient understands and agrees with plan.





Departure


Diagnosis:  


 Primary Impression:  


 Kidney stones


Condition:  Stable











JACIEL PAYNE 2017 15:15 [facility] AMBULATORY ENCOUNTER  INTERNAL MEDICINE OFFICE VISIT+SUBSEQUENT MEDICARE WELLNESS VISIT      CHIEF COMPLAINT:    Chief Complaint   Patient presents with   â¢ Medicare Wellness Visit   â¢ Follow-up     6 month       HISTORY OF PRESENT ILLNESS:    Hilary Rose is a 76year old male who presents today for follow up of active medical problems. The patient is also here for subsequent medicare wellness visit. Reports erectile dysfunction. Interested in a procedure. COPD and Asthma - uses albuterol inhaler as needed. Pulmonology follows. Â   Marijuana abuse. Diabetes - Endocrine follows. On Semaglutide, glipizide, and Lantus. Reports nocturnal frequency. Â   Hypertension - takes amlodipine-valsartan. Â   Hyperlipidemia - atorvastatin daily. Â   History of blood in urine and kidney disease - Nephrology follows yearly. Â   PAD - takes cilostazol and aspirin. Cardiology follows from outside of [facility]. Lichen simplex chronicus - dermatology follows. Uses triamcinolone cream.   Â   Memory problem - geriatric follows. Â   YAYA - CPAP. Sleep medicine follows. Â     Patient Care Team:  Orin Cullen DO as PCP - General (Internal Medicine)  Carlos Herbert NP (Inactive) as Referring Provider (Nurse Practitioner)  Agnes Sahni MD as Endocrinologist (Internal Medicine - Endocrinology,Diabetes,Metabolism)  Kenyetta Cortez DPM as Podiatry ([facility])  Marlin Mensah MD as Pulmonary Medicine (Sleep Medicine)  Peggyann Litten, MD as Dermatology (Dermatology)  Zuri De La Garza MD as Nephrologist (Nephrology)     PROBLEM LIST:    Patient Active Problem List   Diagnosis   â¢ Gross hematuria   â¢ Diabetes mellitus, type II (CMS/HCC)   â¢ DM (diabetes mellitus), type 2 with peripheral vascular complications (CMD)   â¢ Onychomycosis   â¢ Pes planus of both feet   â¢ HT (hammer toe)   â¢ Obstructive sleep apnea   â¢ Asthma   â¢ PVD (peripheral vascular disease) with claudication (CMS/[facility])   â¢ Subclinical hyperthyroidism   â¢ Primary hypertension   â¢ Low serum IgM for age   â¢ Bilateral primary osteoarthritis of knee   â¢ Cervical neck pain with evidence of disc disease s/p discectomy and fusion   â¢ Spondylosis, cervical, with myelopathy   â¢ Tobacco use disorder   â¢ Coronary artery disease involving native coronary artery of native heart without angina pectoris   â¢ Thyroid nodule   â¢ Dependence on nicotine from cigarettes   â¢ Chronic obstructive pulmonary disease (CMD)       HISTORIES:    ALLERGIES:   Allergen Reactions   â¢ Ibuprofen Other (See Comments)     Kidney injury     Current Outpatient Medications   Medication Sig Dispense Refill   â¢ Semaglutide, 2 MG/DOSE, 8 MG/3ML Solution Pen-injector Inject 2 mg into the skin every 7 days. 9 mL 3   â¢ Lancets Misc Check blood sugar three times daily as directed. DX E11.65 300 each 3   â¢ Insulin Pen Needle (Pen Needles) 31G X 6 MM Misc Use to inject basal insulin daily and Ozempic once weekly 100 each 3   â¢ Blood Glucose Monitoring Suppl (Blood Glucose Monitor System) w/Device Kit Test blood sugar 2 times daily. 1 kit 0   â¢ atorvastatin (LIPITOR) 40 MG tablet Take 1 tablet by mouth daily. 90 tablet 1   â¢ clobetasol (TEMOVATE) 0.05 % ointment Apply topically 2 times daily. 30 g 3   â¢ triamcinolone (ARISTOCORT) 0.1 % ointment Apply topically 2 times daily. 30 g 0   â¢ aspirin (Aspirin Low Dose) 81 MG EC tablet Take 1 tablet by mouth daily. 90 tablet 3   â¢ Insulin Glargine-yfgn (Semglee, yfgn,) 100 UNIT/ML Solution Pen-injector Inject 22 Units into the skin every morning. Prime 2 units before each dose. 30 mL 1   â¢ glipiZIDE (GLUCOTROL) 5 MG tablet Take 1 tablet by mouth 2 times daily (before meals). 180 tablet 1   â¢ amlodipine-valsartan (EXFORGE) 5-160 MG per tablet Take 1 tablet by mouth daily. 90 tablet 1   â¢ cilostazol (PLETAL) 50 MG tablet Take 1 tablet by mouth 2 times daily.  180 tablet 1   â¢ ergocalciferol (DRISDOL) 1.25 mg (50,000 units) capsule Take 1 capsule by mouth 1 day a week. 12 capsule 3   â¢ blood glucose test strip Test blood sugar 3 times daily. 300 each 3   â¢ olopatadine (PATADAY) 0.2 % ophthalmic solution Place 1 drop into both eyes daily as needed for itching 2.5 mL 12     No current facility-administered medications for this visit.      Immunization History   Administered Date(s) Administered   â¢ COVID Pfizer 12Y+ (Requires Dilution) 05/20/2021, 06/10/2021, 11/04/2021   â¢ 5352 APJeT 12Y+ 11/08/2022   â¢ Influenza, High Dose quadrivalent, preserve-free 11/04/2021, 11/08/2022   â¢ Influenza, Unspecified Formulation 01/17/2012, 01/17/2012   â¢ Influenza, high dose seasonal, preservative-free 10/30/2019   â¢ Influenza, quadrivalent, preserve-free 12/09/2015, 09/02/2016, 09/12/2017, 10/18/2018, 10/23/2020   â¢ Influenza, seasonal, injectable, trivalent 09/21/2010, 09/21/2010, 01/17/2012, 11/16/2012, 11/16/2012, 12/05/2013, 12/05/2013, 12/09/2015, 09/02/2016   â¢ Pneumococcal Conjugate 13 Valent Vacc (Prevnar 13) 10/30/2019   â¢ Pneumococcal Polysaccharide Vacc (Pneumovax 23) 03/13/2013, 03/13/2013, 02/14/2020   â¢ Shingrix (Shingles Zoster) 10/23/2020   â¢ TD Adult, Adsorbed 08/08/2005, 08/08/2005   â¢ Td:Adult type tetanus/diphtheria 08/08/2005   â¢ Tdap 04/22/2019     Past Medical History:   Diagnosis Date   â¢ Acquired keratoderma    â¢ Asthma    â¢ Bilateral primary osteoarthritis of knee     with right knee medial meniscal tear on MRI in May 2015   â¢ Cataracts, bilateral    â¢ Chronic kidney disease 2020    stage 1   â¢ Diabetes mellitus (CMD)    â¢ Diverticulosis 10/16/2013   â¢ Dry eye syndrome    â¢ Essential (primary) hypertension    â¢ Gross hematuria    â¢ High cholesterol    â¢ YAYA (obstructive sleep apnea)     CPAP maching   â¢ Peripheral vascular disease (CMD)     s/p R external iliac stent in 2015   â¢ Subclinical hyperthyroidism    â¢ Vertebrobasilar dolichoectasia    â¢ Wears reading eyeglasses      Past Surgical History:   Procedure Laterality Date   â¢ "Arthrodesis anterior anterbody cervical below c2  02/20/2017    ACDF C3-4 and C4-5 with interbody cage, demineralized bone matrix and anterior plating/[facility] [place]/2/20/17   â¢ Colonoscopy diagnostic  10/21/2016    Dr. Jillyn Nageotte polyp/ diverticulosis 3yr recall    â¢ Colonoscopy w/ polypectomy  05/18/2021    Dr. Martha Thomas, Recall in 5 years, polyps removed, pancolonic diverticulosis, internal hemorrhoids,   â¢ Flexible sigmoidoscopy  10/16/2013    Dr. Lizett Rosa   â¢ Hernia repair      age 1   â¢ Lipoma resection  2000    fatty tissue removed from back    â¢ Peripheral angiogram  04/20/2018    [place]    â¢ Peripheral angiogram Right 07/22/2016    [place]      Social History     Tobacco Use   â¢ Smoking status: Every Day     Current packs/day: 0.50     Average packs/day: 0.5 packs/day for 42.0 years (21.0 pk-yrs)     Types: Cigarettes   â¢ Smokeless tobacco: Never   â¢ Tobacco comments:     smokes 3-4 cigarettes per day quit info perviously given   Vaping Use   â¢ Vaping status: never used   Substance Use Topics   â¢ Alcohol use: Yes     Alcohol/week: 0.0 - 1.0 standard drinks of alcohol   â¢ Drug use: Yes     Types: Marijuana     Comment: daily     Family History   Problem Relation Age of Onset   â¢ Stroke Father 67   â¢ Heart disease Mother 46   â¢ Aneurysm Sister    â¢ Early death Sister        I have reviewed the patient's medications and allergies, past medical, surgical, social and family history, updating these as appropriate. See Histories section of the electronic medical record for a display of this information. REVIEW OF SYSTEMS:    All other systems are reviewed and are negative except as documented in the history of present illness.     PHYSICAL EXAM:  Vital Signs:    Visit Vitals  /82 (BP Location: RUE - Right upper extremity, Patient Position: Sitting, Cuff Size: Regular)   Pulse 100   Temp 98.2 Â°F (36.8 Â°C)   Ht 5' 7.5"" (1.715 m)   Wt 82.6 kg (182 lb)   SpO2 99%   BMI 28.08 kg/mÂ² " Pulse Ox Interpretation:  Within normal limits. General:   Alert, cooperative, conversive in no acute distress. Head:  Normocephalic, atraumatic. Eyes:  Normal conjunctivae and sclerae. Extraocular movements intact. Cardiovascular:  Symmetrical pulses. Regular rate and rhythm without murmur. Respiratory:  Normal respiratory effort. Clear to auscultation. No wheezes, rales or rhonchi. Gastrointestinal:  Soft and non tender. Musculoskeletal:  No deformity or edema. No tenderness to palpation. Neurologic:   Alert. No focal deficits or lateralizing signs. Psychiatric:   Cooperative. Appropriate mood and affect.     LABORATORY DATA:    Lab Results   Component Value Date    SODIUM 139 01/09/2023    SODIUM 140 08/31/2022    POTASSIUM 4.0 01/09/2023    POTASSIUM 3.7 08/31/2022    CHLORIDE 107 01/09/2023    CHLORIDE 108 08/31/2022    CO2 25 01/09/2023    CO2 28 08/31/2022    BUN 8 01/09/2023    BUN 10 08/31/2022    CREATININE 0.84 01/09/2023    CREATININE 0.88 08/31/2022    GLUCOSE 107 (H) 01/09/2023    GLUCOSE 87 08/31/2022     Lab Results   Component Value Date    WBC 8.6 01/09/2023    WBC 13.1 (H) 08/31/2022    HCT 44.9 01/09/2023    HCT 48.3 08/31/2022    HGB 15.5 01/09/2023    HGB 15.8 08/31/2022     01/09/2023     08/31/2022     Lab Results   Component Value Date    GLUCOSE 107 (H) 01/09/2023    GLUCOSE 87 08/31/2022    SODIUM 139 01/09/2023    SODIUM 140 08/31/2022    POTASSIUM 4.0 01/09/2023    POTASSIUM 3.7 08/31/2022    CHLORIDE 107 01/09/2023    CHLORIDE 108 08/31/2022    BUN 8 01/09/2023    BUN 10 08/31/2022    CREATININE 0.84 01/09/2023    CREATININE 0.88 08/31/2022    CALCIUM 9.0 01/09/2023    CALCIUM 9.2 08/31/2022    ALBUMIN 3.6 08/31/2022    ALBUMIN 4.0 09/09/2015    TP 6.9 06/20/2016    AST 8 08/31/2022    AST 11 09/09/2015    ALKPT 103 08/31/2022    ALKPT 96 09/09/2015    GPT 17 08/31/2022    GPT 17 09/09/2015    ANIONGAP 11 01/09/2023    ANIONGAP 8 08/31/2022    BCRAT 12 01/22/2020    BCRAT 12 04/11/2019    GLOB 3.7 08/31/2022    GLOB 3.3 09/09/2015    AGR 1.0 08/31/2022    AGR 1.2 09/09/2015     Hemoglobin A1C   Date Value   07/23/2020 8.5 % (H)   06/06/2019 7.8     No results found for: INR  TSH (mcUnits/mL)   Date Value   07/13/2022 0.250 (L)   05/04/2022 0.249 (L)     Lab Results   Component Value Date    COL Yellow 01/09/2023    UAPP Clear 01/09/2023    USPG 1.018 01/09/2023    UPH 5.5 01/09/2023    UPROT Negative 01/09/2023    UGLU Negative 01/09/2023    UKET Negative 01/09/2023    UBILI Negative 01/09/2023    URBC Negative 01/09/2023    UNITR Negative 01/09/2023    UROB 0.2 01/09/2023    UWBC Negative 01/09/2023     Lab Results   Component Value Date    CHOLESTEROL 108 01/09/2023    CHOLESTEROL 108 01/07/2022    HDL 54 01/09/2023    HDL 58 01/07/2022    CALCLDL 47 01/09/2023    CALCLDL 42 01/07/2022    TRIGLYCERIDE 37 01/09/2023    TRIGLYCERIDE 40 01/07/2022     Lab Results   Component Value Date    AST 8 08/31/2022    AST 11 09/09/2015    GPT 17 08/31/2022    GPT 17 09/09/2015    ALKPT 103 08/31/2022    ALKPT 96 09/09/2015    BILIRUBIN 0.7 08/31/2022    BILIRUBIN 0.6 09/09/2015     PSA, Total (ng/mL)   Date Value   10/11/2018 1.42     Prostate Specific Antigen (ng/mL)   Date Value   08/05/2022 4.09     No results found for: CPK    DEPRESSION ASSESSMENT/PLAN:  Recent Review Flowsheet Data     Date 5/9/2023    Adult PHQ 2 Score 2    Adult PHQ 2 Interpretation No further screening needed    Little interest or pleasure in activity? Several days    Feeling down, depressed or hopeless? Several days        Depression screening is negative no further plan needed. ASSESSMENT / PLAN    Medicare annual wellness visit, subsequent  - Performed and reviewed. - See note below. Erectile dysfunction, unspecified erectile dysfunction type  - SERVICE TO UROLOGY    Hyperlipidemia, unspecified hyperlipidemia type  - Continue current therapy.    - Thyroid Stimulating Hormone Reflex; Future  - Lipid Panel With Reflex; Future  - Glycohemoglobin; Future  - Basic Metabolic Panel; Future    Primary hypertension  - Controlled, /82  - Continue current therapy. - Thyroid Stimulating Hormone Reflex; Future  - Lipid Panel With Reflex; Future  - Glycohemoglobin; Future  - Basic Metabolic Panel; Future    Type 2 diabetes mellitus with hyperglycemia, with long-term current use of insulin (CMD)  - Endocrine follows. - Thyroid Stimulating Hormone Reflex; Future  - Lipid Panel With Reflex; Future  - Glycohemoglobin; Future  - Basic Metabolic Panel; Future    Prostate cancer screening  - PSA; Future    Screening for malignant neoplasm of respiratory organ  - CT Lung Cancer Screening Low Dose WO Contrast; Future  - Counseling Visit to Discuss Need For Lung Cancer Screening    Personal history of tobacco use, presenting hazards to health  - CT Lung Cancer Screening Low Dose WO Contrast; Future  - Counseling Visit to Discuss Need For Lung Cancer Screening    Depression screening  - Annual Depression Screening performed 15 minutes    Chronic obstructive pulmonary disease, unspecified COPD type (CMD)  - Pulmonology follows. Stable. Cannabis dependence, continuous (CMD)  - Pt continues to use. Recommend quitting. PAD (peripheral artery disease) (CMD)  - Cardiology following. Orders Placed This Encounter   â¢ Annual Depression Screening 15 minutes   â¢ Counseling Visit to Discuss Need For Lung Cancer Screening   â¢ CT Lung Cancer Screening Low Dose WO Contrast   â¢ Thyroid Stimulating Hormone Reflex   â¢ Lipid Panel With Reflex   â¢ Glycohemoglobin   â¢ PSA   â¢ Basic Metabolic Panel   â¢ SERVICE TO UROLOGY       Return in about 6 months (around 11/9/2023) for Follow up for chronic problems. Instructions provided as documented in the after visit summary. The patient indicated understanding of the diagnosis and agreed with the plan of care.    I spent a total of 35 minutes on the day of the visit.  This includes pre-charting, chart review and documenting. 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 He has complaints or concerns which were discussed today. See note above.       Patient Care Team:  Ken Purcell DO as PCP - General (Internal Medicine)  Isra Adrian NP (Inactive) as Referring Provider (Nurse Practitioner)  Siddhartha Galvez MD as Endocrinologist (Internal Medicine - Endocrinology,Diabetes,Metabolism)  Des Trimble DPM as Podiatry ([facility])  Feliciano Summers MD as Pulmonary Medicine (Sleep Medicine)  Farncisco Kaur MD as Dermatology (Dermatology)  Debra Coon MD as Nephrologist (Nephrology)        Patient Active Problem List   Diagnosis   â¢ Gross hematuria   â¢ Diabetes mellitus, type II (CMS/HCC)   â¢ DM (diabetes mellitus), type 2 with peripheral vascular complications (CMD)   â¢ Onychomycosis   â¢ Pes planus of both feet   â¢ HT (hammer toe)   â¢ Obstructive sleep apnea   â¢ Asthma   â¢ PVD (peripheral vascular disease) with claudication (CMS/[facility])   â¢ Subclinical hyperthyroidism   â¢ Primary hypertension   â¢ Low serum IgM for age   â¢ Bilateral primary osteoarthritis of knee   â¢ Cervical neck pain with evidence of disc disease s/p discectomy and fusion   â¢ Spondylosis, cervical, with myelopathy   â¢ Tobacco use disorder   â¢ Coronary artery disease involving native coronary artery of native heart without angina pectoris   â¢ Thyroid nodule   â¢ Dependence on nicotine from cigarettes   â¢ Chronic obstructive pulmonary disease (CMD)         Past Medical History:   Diagnosis Date   â¢ Acquired keratoderma    â¢ Asthma    â¢ Bilateral primary osteoarthritis of knee     with right knee medial meniscal tear on MRI in May 2015   â¢ Cataracts, bilateral    â¢ Chronic kidney disease 2020    stage 1   â¢ Diabetes mellitus (CMD)    â¢ Diverticulosis 10/16/2013   â¢ Dry eye syndrome    â¢ Essential (primary) hypertension    â¢ Gross hematuria    â¢ High cholesterol    â¢ YAYA (obstructive sleep apnea)     CPAP maching   â¢ Peripheral vascular disease (CMD)     s/p R external iliac stent in 2015   â¢ Subclinical hyperthyroidism    â¢ Vertebrobasilar dolichoectasia    â¢ Wears reading eyeglasses          Past Surgical History:   Procedure Laterality Date   â¢ Arthrodesis anterior anterbody cervical below c2  02/20/2017    ACDF C3-4 and C4-5 with interbody cage, demineralized bone matrix and anterior plating/[facility] Dr Templeton/2/20/17   â¢ Colonoscopy diagnostic  10/21/2016    Dr. Avelar School polyp/ diverticulosis 3yr recall    â¢ Colonoscopy w/ polypectomy  05/18/2021    Dr. Fantasma Valenzuela, Recall in 5 years, polyps removed, pancolonic diverticulosis, internal hemorrhoids,   â¢ Flexible sigmoidoscopy  10/16/2013    Dr. Reinier Louis   â¢ Hernia repair      age 1   â¢ Lipoma resection  2000    fatty tissue removed from back    â¢ Peripheral angiogram  04/20/2018    [place]    â¢ Peripheral angiogram Right 07/22/2016    [place]          Social History     Tobacco Use   â¢ Smoking status: Every Day     Current packs/day: 0.50     Average packs/day: 0.5 packs/day for 42.0 years (21.0 pk-yrs)     Types: Cigarettes   â¢ Smokeless tobacco: Never   â¢ Tobacco comments:     smokes 3-4 cigarettes per day quit info perviously given   Vaping Use   â¢ Vaping status: never used   Substance Use Topics   â¢ Alcohol use:  Yes     Alcohol/week: 0.0 - 1.0 standard drinks of alcohol   â¢ Drug use: Yes     Types: Marijuana     Comment: daily     Drug use:    Drug Use:    Yes                Special: Marijuana       Comment: daily    Family History   Problem Relation Age of Onset   â¢ Stroke Father 67   â¢ Heart disease Mother 46   â¢ Aneurysm Sister    â¢ Early death Sister        Current Outpatient Medications   Medication Sig Dispense Refill   â¢ Semaglutide, 2 MG/DOSE, 8 MG/3ML Solution Pen-injector Inject 2 mg into the skin every 7 days. 9 mL 3   â¢ Lancets Misc Check blood sugar three times daily as directed. DX E11.65 300 each 3   â¢ Insulin Pen Needle (Pen Needles) 31G X 6 MM Misc Use to inject basal insulin daily and Ozempic once weekly 100 each 3   â¢ Blood Glucose Monitoring Suppl (Blood Glucose Monitor System) w/Device Kit Test blood sugar 2 times daily. 1 kit 0   â¢ atorvastatin (LIPITOR) 40 MG tablet Take 1 tablet by mouth daily. 90 tablet 1   â¢ clobetasol (TEMOVATE) 0.05 % ointment Apply topically 2 times daily. 30 g 3   â¢ triamcinolone (ARISTOCORT) 0.1 % ointment Apply topically 2 times daily. 30 g 0   â¢ aspirin (Aspirin Low Dose) 81 MG EC tablet Take 1 tablet by mouth daily. 90 tablet 3   â¢ Insulin Glargine-yfgn (Semglee, yfgn,) 100 UNIT/ML Solution Pen-injector Inject 22 Units into the skin every morning. Prime 2 units before each dose. 30 mL 1   â¢ glipiZIDE (GLUCOTROL) 5 MG tablet Take 1 tablet by mouth 2 times daily (before meals). 180 tablet 1   â¢ amlodipine-valsartan (EXFORGE) 5-160 MG per tablet Take 1 tablet by mouth daily. 90 tablet 1   â¢ cilostazol (PLETAL) 50 MG tablet Take 1 tablet by mouth 2 times daily. 180 tablet 1   â¢ ergocalciferol (DRISDOL) 1.25 mg (50,000 units) capsule Take 1 capsule by mouth 1 day a week. 12 capsule 3   â¢ blood glucose test strip Test blood sugar 3 times daily. 300 each 3   â¢ olopatadine (PATADAY) 0.2 % ophthalmic solution Place 1 drop into both eyes daily as needed for itching 2.5 mL 12     No current facility-administered medications for this visit.         The following items on the Medicare Health Risk Assessment were found to be positive  1.) Do you have an Advance directive, living will, or power of  for health care document that contains your wishes for end of life care?: No     4.) During the past 4 weeks, what was the hardest physical activity you could do for at least 2 minutes?: Light     6 a.) How many servings of Fruits and Vegetables do you have each day ( 1 serving = 1 piece of fruit, 1/2 cup fruits or vegetables): None     6 b.) How many servings of High Fiber / Whole Grain Foods to you have each day ( 1 serving = 1 cup cold cereal, 1/2 cup cooked cereal, 1 slice bread): 1 per day     8.) During the past 4 weeks, has your physical and emotional health limited your social activities with family, friends, neighbors, or other groups?: Moderately     11l.) Sexual Problems: Often     14.) During the past 4 weeks, was someone available to help if you needed and wanted help?: Yes, some         Vision and Hearing screens: Both screenings were performed and reviewed    Advance care planning documents on file - no     Cognitive/Functional Status: preexisting cognitive issues - stable    Opioid Review: Sha Lemon is not taking opioid medications. Recent PHQ 2/9 Score:    PHQ 2:  Date Adult PHQ 2 Score Adult PHQ 2 Interpretation   5/9/2023 2 No further screening needed     DEPRESSION ASSESSMENT/PLAN:  Depression screening is negative no further plan needed. Body mass index is 28.08 kg/mÂ². BMI ASSESSMENT/PLAN:  Patient is overweight. 30 minutes of physical activity a day and Low carbohydrate diet        Needed Screening/Treatment:   Cardiovascular screening - Lipids , Diabetes screening , Lung Cancer Screening and Prostate Cancer Screening  Needed follow up:  None    See orders. See Patient Instructions section. Return in about 6 months (around 11/9/2023) for Follow up for chronic problems. CT Lung Screening Counseling -  Based on his age of 76year old and smoking history as documented in the Social History obtaining a CT Lung Screening Testing should be considered.    He is free of symptoms such as chronic cough, hemoptysis, weight loss which would prompt a Diagnostic CT Lung request.     The potential benefits of this testing include:  Â· Early detection of lung cancers, which can improve treatment results  Â· Evaluation of lung damage from smoking (emphysema)    The potential risks of this testing include:    Â· Scanning may miss small / early tumors  Â· Scanning may detect small lesions of uncertain nature which require repeat testing  Â· Scanning may detect lesions which prompt further testing - which may turn out to be cancer, but may also turn out to be non-cancerous. More than 95% of abnormal screening chest CTs turn out to be false alarms when further tested; most of these did not require procedures or invasive testing to clarify. 2-3% of high-risk patients will have bronchoscopy, needle biopsy or surgery with a normal end result. The rate of serious complication is about 0.3 %  Â· It is estimated that there is 10-12% risk of over diagnosing cancer that is so small it would never have caused trouble during the patient's lifetime  Â· Radiation exposures accumulate over time. The low dose CT lung has radiation exposure equivalent to about 6 months of background radiation from the environment. The risk of dying of a cancer related to radiation from a standard CT is less than 0.05%    It is recommended that this screening CT be repeated on a yearly basis up to age 68 years or until 15 years after stopping smoking. Smoking status was reviewed with tobacco cessation strategies and support information provided. All his questions were answered and he does  wish to proceed.       Detailed risk / benefit summary from [facility]